# Patient Record
Sex: FEMALE | Race: WHITE | NOT HISPANIC OR LATINO | URBAN - METROPOLITAN AREA
[De-identification: names, ages, dates, MRNs, and addresses within clinical notes are randomized per-mention and may not be internally consistent; named-entity substitution may affect disease eponyms.]

---

## 2017-01-05 ENCOUNTER — ON CAMPUS - OUTPATIENT (INPATIENT)
Dept: URBAN - METROPOLITAN AREA HOSPITAL 108 | Facility: HOSPITAL | Age: 32
End: 2017-01-05
Payer: COMMERCIAL

## 2017-01-05 DIAGNOSIS — R19.7 DIARRHEA, UNSPECIFIED: ICD-10-CM

## 2017-01-05 DIAGNOSIS — K62.5 HEMORRHAGE OF ANUS AND RECTUM: ICD-10-CM

## 2017-01-05 DIAGNOSIS — K59.00 CONSTIPATION, UNSPECIFIED: ICD-10-CM

## 2017-01-05 DIAGNOSIS — K29.50 UNSPECIFIED CHRONIC GASTRITIS WITHOUT BLEEDING: ICD-10-CM

## 2017-01-05 DIAGNOSIS — R11.2 NAUSEA WITH VOMITING, UNSPECIFIED: ICD-10-CM

## 2017-01-05 DIAGNOSIS — K20.9 ESOPHAGITIS, UNSPECIFIED: ICD-10-CM

## 2017-01-05 DIAGNOSIS — K31.89 OTHER DISEASES OF STOMACH AND DUODENUM: ICD-10-CM

## 2017-01-05 DIAGNOSIS — K29.80 DUODENITIS WITHOUT BLEEDING: ICD-10-CM

## 2017-01-05 PROCEDURE — 43239 EGD BIOPSY SINGLE/MULTIPLE: CPT | Performed by: INTERNAL MEDICINE

## 2017-01-05 PROCEDURE — 45380 COLONOSCOPY AND BIOPSY: CPT | Performed by: INTERNAL MEDICINE

## 2017-02-08 ENCOUNTER — OFFICE (INPATIENT)
Dept: URBAN - METROPOLITAN AREA CLINIC 75 | Facility: CLINIC | Age: 32
End: 2017-02-08
Payer: COMMERCIAL

## 2017-02-08 VITALS
SYSTOLIC BLOOD PRESSURE: 126 MMHG | HEART RATE: 90 BPM | DIASTOLIC BLOOD PRESSURE: 84 MMHG | WEIGHT: 293 LBS | HEIGHT: 60 IN

## 2017-02-08 DIAGNOSIS — R11.2 NAUSEA WITH VOMITING, UNSPECIFIED: ICD-10-CM

## 2017-02-08 DIAGNOSIS — K58.0 IRRITABLE BOWEL SYNDROME WITH DIARRHEA: ICD-10-CM

## 2017-02-08 PROCEDURE — 99214 OFFICE O/P EST MOD 30 MIN: CPT | Performed by: INTERNAL MEDICINE

## 2017-02-08 RX ORDER — HYOSCYAMINE SULFATE EXTENDED-RELEASE 0.38 MG/1
TABLET ORAL
Qty: 60 | Refills: 1 | Status: COMPLETED
End: 2017-02-08

## 2017-02-08 RX ORDER — ONDANSETRON 8 MG/1
TABLET, FILM COATED ORAL
Qty: 0 | Refills: 0 | Status: ACTIVE

## 2017-02-08 RX ORDER — RIFAXIMIN 550 MG/1
TABLET ORAL
Qty: 42 | Refills: 2 | Status: ACTIVE
Start: 2017-02-08

## 2017-02-08 RX ORDER — DICYCLOMINE HYDROCHLORIDE 10 MG/1
CAPSULE ORAL
Qty: 90 | Refills: 11 | Status: ACTIVE
Start: 2017-02-08

## 2017-08-23 ENCOUNTER — TREATMENT (OUTPATIENT)
Dept: PHYSICAL THERAPY | Facility: CLINIC | Age: 32
End: 2017-08-23

## 2017-08-23 ENCOUNTER — TRANSCRIBE ORDERS (OUTPATIENT)
Dept: PHYSICAL THERAPY | Facility: CLINIC | Age: 32
End: 2017-08-23

## 2017-08-23 DIAGNOSIS — M25.561 RIGHT KNEE PAIN, UNSPECIFIED CHRONICITY: Primary | ICD-10-CM

## 2017-08-23 PROCEDURE — 97014 ELECTRIC STIMULATION THERAPY: CPT | Performed by: PHYSICAL THERAPIST

## 2017-08-23 PROCEDURE — 97001 PR PHYS THERAPY EVALUATION: CPT | Performed by: PHYSICAL THERAPIST

## 2017-08-23 PROCEDURE — 97110 THERAPEUTIC EXERCISES: CPT | Performed by: PHYSICAL THERAPIST

## 2017-08-23 NOTE — PROGRESS NOTES
Physical Therapy Initial Evaluation and Plan of Care    Time In  1010  Time Out  1055    Subjective Evaluation    History of Present Illness  Date of onset: 2017  Mechanism of injury: Works at a .  Child was going down the slide and was about to fall off.  Patient went over and squatted down quickly to catch the child.      Patient Occupation:    Precautions and Work Restrictions: light dutyQuality of life: excellent    Pain  Current pain ratin  At worst pain rating: 10  Location: right posterior knee (medial more than lateral)  Quality: dull ache and sharp  Relieving factors: rest  Aggravating factors: ambulation  Progression: no change    Diagnostic Tests  X-ray: normal  MRI studies: abnormal (piece of cartilage gone)    Patient Goals  Patient goal: return to work           Objective     Observations     Additional Observation Details  Minimal antalgic gait pattern.  No acute distress.    Palpation     Additional Palpation Details  TTP to right medial joint line and medial hamstring tendon.    Active Range of Motion     Right Knee   Flexion: 107 degrees with pain  Extension: Right knee active extension: -1.     Strength/Myotome Testing     Right Knee   Flexion: 4+  Extension: 4    Additional Strength Details  Pain with both planes.    Tests     Right Knee   Positive valgus stress test at 0 degrees, valgus stress test at 30 degrees, varus stress test at 0 degrees and varus stress test at 30 degrees.   Negative medial Gurinder.          Assessment & Plan     Assessment  Impairments: abnormal gait, abnormal or restricted ROM, activity intolerance, impaired physical strength, lacks appropriate home exercise program and pain with function  Assessment details: Patient presents with c/o pain, TTP, limited knee flexion, decreased strength, antalgic gait pattern and positive special testing which is limiting her ability to perform full job duties and ADL'S.  Barriers to therapy: none  Prognosis:  good  Prognosis details: STG's to be met by 4 weeks  1)  Independent with HEP  2)  Decrease pain by 50% or more  3)  AROM WNL for right knee flexion  4)  Min to no TTP to right knee    LTG's to be met by 8 weeks  1)  Independent with HEP progression  2)  Decrease pain by 75% or more  3)  Increase strength for right knee to 4+/5  4)  Negative special testing  5)  No TTP to right knee  6)  Patient to ambulate with a normal gait pattern  7)  Patient to lift floor to waist up to 35 lbs  8)  Patient to push/pull up to 50 lbs      Plan  Therapy options: will be seen for skilled physical therapy services  Planned modality interventions: TENS, ultrasound, iontophoresis and cryotherapy  Planned therapy interventions: strengthening, stretching, therapeutic activities and home exercise program  Frequency: 3x week  Duration in weeks: 8  Treatment plan discussed with: patient        Manual Therapy:    0     mins  08946;  Therapeutic Exercise:    14     mins  97659;    Neuromuscular Katrin:    0    mins  53711;    Therapeutic Activity:     0     mins  44980;     Gait Trainin     mins  73793;     Ultrasound:     0     mins  45373;    Work Hardening           0      mins 73861  Iontophoresis               0   mins 43253    Timed Treatment:   14   mins   Total Treatment:     45   mins    PT SIGNATURE: Pilo Sanchez, PT   DATE TREATMENT INITIATED: 2017    Initial Certification  Certification Period: 2017  I certify that the therapy services are furnished while this patient is under my care.  The services outlined above are required by this patient, and will be reviewed every 90 days.     PHYSICIAN:       DATE:     Please sign and return via fax to [phone].. Thank you, [facility].

## 2017-08-30 ENCOUNTER — TREATMENT (OUTPATIENT)
Dept: PHYSICAL THERAPY | Facility: CLINIC | Age: 32
End: 2017-08-30

## 2017-08-30 DIAGNOSIS — M25.561 RIGHT KNEE PAIN, UNSPECIFIED CHRONICITY: Primary | ICD-10-CM

## 2017-08-30 PROCEDURE — 97110 THERAPEUTIC EXERCISES: CPT | Performed by: PHYSICAL THERAPIST

## 2017-08-30 PROCEDURE — 97014 ELECTRIC STIMULATION THERAPY: CPT | Performed by: PHYSICAL THERAPIST

## 2017-08-30 NOTE — PROGRESS NOTES
Physical Therapy Daily Progress Note    Time In 105  Time Out 154        Subjective  Patient reports that the knee is ok, reports mild pain (3-4/10).    Objective   See Exercise, Manual, and Modality Logs for complete treatment.       Assessment/Plan  Patient tolerated the progression of open and closed chain strengthening exercises without significant c/o pain in the knee, but reported an increase in pain after the routine (5-6/10).                   Manual Therapy:    0     mins  27940;  Therapeutic Exercise:    25     mins  63089;     Neuromuscular Katrin:    0    mins  76366;    Therapeutic Activity:     0     mins  65413;     Gait Trainin     mins  48740;     Ultrasound:     0     mins  91667;    Work Hardening           0      mins 42948  Iontophoresis               0   mins 56426    Timed Treatment:   25   mins   Total Treatment:     49   mins    Pilo Sanchez, PT  Physical Therapist

## 2017-09-01 ENCOUNTER — TREATMENT (OUTPATIENT)
Dept: PHYSICAL THERAPY | Facility: CLINIC | Age: 32
End: 2017-09-01

## 2017-09-01 DIAGNOSIS — M25.561 RIGHT KNEE PAIN, UNSPECIFIED CHRONICITY: Primary | ICD-10-CM

## 2017-09-01 PROCEDURE — 97110 THERAPEUTIC EXERCISES: CPT | Performed by: PHYSICAL THERAPIST

## 2017-09-01 PROCEDURE — 97014 ELECTRIC STIMULATION THERAPY: CPT | Performed by: PHYSICAL THERAPIST

## 2017-09-01 NOTE — PROGRESS NOTES
Physical Therapy Daily Progress Note    Time In 1254  Time Out 145        Subjective  Patient reports an increase in pain after last treatment, currently rates the pain at 7/10.    Objective   See Exercise, Manual, and Modality Logs for complete treatment.       Assessment/Plan  No significant increase in the exercise routine secondary to increased subjective reports.  Patient performed the exercise routine without increased c/o pain in the right knee.                   Manual Therapy:    0     mins  64193;  Therapeutic Exercise:    29     mins  96283;     Neuromuscular Katrin:    0    mins  08893;    Therapeutic Activity:     0     mins  28580;     Gait Trainin     mins  70038;     Ultrasound:     0     mins  83986;    Work Hardening           0      mins 69547  Iontophoresis               0   mins 95017    Timed Treatment:   29   mins   Total Treatment:     51   mins    Pilo Sanchez, PT  Physical Therapist

## 2017-09-08 ENCOUNTER — TREATMENT (OUTPATIENT)
Dept: PHYSICAL THERAPY | Facility: CLINIC | Age: 32
End: 2017-09-08

## 2017-09-08 DIAGNOSIS — M25.561 RIGHT KNEE PAIN, UNSPECIFIED CHRONICITY: Primary | ICD-10-CM

## 2017-09-08 PROCEDURE — 97110 THERAPEUTIC EXERCISES: CPT | Performed by: PHYSICAL THERAPIST

## 2017-09-08 PROCEDURE — 97014 ELECTRIC STIMULATION THERAPY: CPT | Performed by: PHYSICAL THERAPIST

## 2017-09-08 NOTE — PROGRESS NOTES
Physical Therapy Daily Progress Note    Time In 107  Time Out 155        Subjective  Patient reports that the knee is ok, rates the pain at 4/10.  Patient states that it feels the same since beginning PT.      Objective   See Exercise, Manual, and Modality Logs for complete treatment.       Assessment/Plan  Patient tolerated the exercise routine moderately well, without significant c/o pain in the right knee.  No significant improvements have occurred with regard to the subjective reports.      Plan  Begin closed chain exercises next treatment                Manual Therapy:    0     mins  98732;  Therapeutic Exercise:    31     mins  98063;     Neuromuscular Katrin:    0    mins  91672;    Therapeutic Activity:     0     mins  69151;     Gait Trainin     mins  61632;     Ultrasound:     0     mins  31192;    Work Hardening           0      mins 74115  Iontophoresis               0   mins 73233    Timed Treatment:   31   mins   Total Treatment:     48   mins    Pilo Sanchez, PT  Physical Therapist

## 2017-09-13 ENCOUNTER — TREATMENT (OUTPATIENT)
Dept: PHYSICAL THERAPY | Facility: CLINIC | Age: 32
End: 2017-09-13

## 2017-09-13 DIAGNOSIS — M25.561 RIGHT KNEE PAIN, UNSPECIFIED CHRONICITY: Primary | ICD-10-CM

## 2017-09-13 PROCEDURE — 97110 THERAPEUTIC EXERCISES: CPT | Performed by: PHYSICAL THERAPIST

## 2017-09-13 PROCEDURE — 97014 ELECTRIC STIMULATION THERAPY: CPT | Performed by: PHYSICAL THERAPIST

## 2017-09-13 NOTE — PROGRESS NOTES
Re-Assessment / Re-Certification    Time In 102 Time Out 200    Patient: Pallavi Urrutia   : 1985  Diagnosis/ICD-10 Code:  Right knee pain, unspecified chronicity [M25.561]  Referring practitioner: Jamie Freitas MD  Date of Initial Visit: 2017  Today's Date: 2017  Patient seen for 5 sessions      Subjective:   Pallavi Urrutia reports: that the knee feels the same, rates the pain at 5/10, continues to have popping in the knee with pain associated with it.    Treatment has included: therapeutic exercise, electrical stimulation and cryotherapy    Subjective   Objective     Palpation     Additional Palpation Details  TTP to right medial joint line, medial hamstring tendon and minimal to the popliteus.     Active Range of Motion     Right Knee   Flexion: 120 degrees with pain  Extension: Right knee active extension: WNL. with pain    Strength/Myotome Testing     Right Knee   Flexion: 4  Extension: 4+    Tests     Right Knee   Positive valgus stress test at 0 degrees and valgus stress test at 30 degrees.      Assessment/Plan  Patient has tolerated PT well thus far.  AROM has improved for right knee flexion, however deficits persist with regard to pain, TTP, special testing and function.        PT Signature: Pilo Sanchez, PT          Manual Therapy:    0     mins  84369;  Therapeutic Exercise:    41     mins  69145;     Neuromuscular Katrin:    0    mins  01472;    Therapeutic Activity:     0     mins  08275;     Gait Trainin     mins  85061;     Ultrasound:     0     mins  41298;    Work Hardening           0      mins 07558  Iontophoresis               0   mins 35541    Timed Treatment:   41   mins   Total Treatment:     58   mins

## 2017-10-03 ENCOUNTER — DOCUMENTATION (OUTPATIENT)
Dept: PHYSICAL THERAPY | Facility: CLINIC | Age: 32
End: 2017-10-03

## 2017-10-03 NOTE — PROGRESS NOTES
Discharge Summary  Discharge Summary from Physical Therapy Report      Dates  PT visit: 8/23 to 9/13/17  Number of Visits: 5    Discharge Status of Patient: See MD Note dated 9/13/17    Goals: Partially Met    Discharge Plan: Patient did not return after MD f/u.    Comments Patient did not return after MD f/u.    Date of Discharge 10/3/17        Pilo Sanchez, PT  Physical Therapist

## 2018-03-22 ENCOUNTER — HOSPITAL ENCOUNTER (EMERGENCY)
Facility: HOSPITAL | Age: 33
Discharge: LEFT BEFORE DISCHARGE | End: 2018-03-22

## 2018-03-22 VITALS
TEMPERATURE: 99.7 F | HEART RATE: 97 BPM | RESPIRATION RATE: 16 BRPM | OXYGEN SATURATION: 96 % | WEIGHT: 280 LBS | BODY MASS INDEX: 46.65 KG/M2 | HEIGHT: 65 IN

## 2018-11-19 ENCOUNTER — OFFICE (INPATIENT)
Dept: URBAN - METROPOLITAN AREA CLINIC 75 | Facility: CLINIC | Age: 33
End: 2018-11-19
Payer: COMMERCIAL

## 2018-11-19 VITALS
HEIGHT: 60 IN | DIASTOLIC BLOOD PRESSURE: 64 MMHG | WEIGHT: 293 LBS | HEART RATE: 96 BPM | SYSTOLIC BLOOD PRESSURE: 128 MMHG

## 2018-11-19 DIAGNOSIS — R11.2 NAUSEA WITH VOMITING, UNSPECIFIED: ICD-10-CM

## 2018-11-19 DIAGNOSIS — Z80.0 FAMILY HISTORY OF MALIGNANT NEOPLASM OF DIGESTIVE ORGANS: ICD-10-CM

## 2018-11-19 DIAGNOSIS — K59.00 CONSTIPATION, UNSPECIFIED: ICD-10-CM

## 2018-11-19 DIAGNOSIS — K58.0 IRRITABLE BOWEL SYNDROME WITH DIARRHEA: ICD-10-CM

## 2018-11-19 PROCEDURE — 99214 OFFICE O/P EST MOD 30 MIN: CPT | Performed by: INTERNAL MEDICINE

## 2018-11-19 RX ORDER — ONDANSETRON 8 MG/1
TABLET, FILM COATED ORAL
Qty: 0 | Refills: 0 | Status: ACTIVE

## 2018-11-19 RX ORDER — RIFAXIMIN 550 MG/1
TABLET ORAL
Qty: 42 | Refills: 2 | Status: ACTIVE
Start: 2018-11-19

## 2020-06-26 VITALS — HEIGHT: 60 IN | WEIGHT: 293 LBS

## 2020-06-29 ENCOUNTER — TELEHEALTH PROVIDED OTHER THAN IN PATIENT'S HOME (INPATIENT)
Dept: URBAN - METROPOLITAN AREA TELEHEALTH 6 | Facility: TELEHEALTH | Age: 35
End: 2020-06-29
Payer: COMMERCIAL

## 2020-06-29 DIAGNOSIS — R11.2 NAUSEA WITH VOMITING, UNSPECIFIED: ICD-10-CM

## 2020-06-29 DIAGNOSIS — K58.0 IRRITABLE BOWEL SYNDROME WITH DIARRHEA: ICD-10-CM

## 2020-06-29 DIAGNOSIS — K59.00 CONSTIPATION, UNSPECIFIED: ICD-10-CM

## 2020-06-29 PROCEDURE — 99214 OFFICE O/P EST MOD 30 MIN: CPT | Mod: 95 | Performed by: INTERNAL MEDICINE

## 2020-12-29 VITALS — HEIGHT: 60 IN | WEIGHT: 293 LBS

## 2021-01-06 ENCOUNTER — OFFICE (INPATIENT)
Dept: URBAN - METROPOLITAN AREA CLINIC 75 | Facility: CLINIC | Age: 36
End: 2021-01-06
Payer: COMMERCIAL

## 2021-01-06 DIAGNOSIS — R93.89 ABNORMAL FINDINGS ON DIAGNOSTIC IMAGING OF OTHER SPECIFIED B: ICD-10-CM

## 2021-01-06 DIAGNOSIS — K59.00 CONSTIPATION, UNSPECIFIED: ICD-10-CM

## 2021-01-06 DIAGNOSIS — R11.2 NAUSEA WITH VOMITING, UNSPECIFIED: ICD-10-CM

## 2021-01-06 DIAGNOSIS — K58.0 IRRITABLE BOWEL SYNDROME WITH DIARRHEA: ICD-10-CM

## 2021-01-06 PROCEDURE — 99213 OFFICE O/P EST LOW 20 MIN: CPT | Mod: 95 | Performed by: INTERNAL MEDICINE
